# Patient Record
Sex: FEMALE | Race: WHITE | NOT HISPANIC OR LATINO | Employment: FULL TIME | URBAN - NONMETROPOLITAN AREA
[De-identification: names, ages, dates, MRNs, and addresses within clinical notes are randomized per-mention and may not be internally consistent; named-entity substitution may affect disease eponyms.]

---

## 2018-03-09 ENCOUNTER — DOCUMENTATION ONLY (OUTPATIENT)
Dept: FAMILY MEDICINE | Facility: OTHER | Age: 38
End: 2018-03-09

## 2018-03-09 RX ORDER — DOCUSATE SODIUM 100 MG/1
100 CAPSULE, LIQUID FILLED ORAL 2 TIMES DAILY
COMMUNITY
Start: 2016-03-09 | End: 2020-10-09

## 2018-03-09 RX ORDER — TOPIRAMATE 100 MG/1
100 TABLET, FILM COATED ORAL PRN
COMMUNITY
End: 2020-10-09

## 2018-03-09 RX ORDER — CIPROFLOXACIN 500 MG/1
1 TABLET, FILM COATED ORAL 2 TIMES DAILY
COMMUNITY
Start: 2016-03-10 | End: 2019-03-31

## 2018-03-09 RX ORDER — METRONIDAZOLE 500 MG/1
500 TABLET ORAL 3 TIMES DAILY
COMMUNITY
Start: 2016-03-10 | End: 2019-03-31

## 2019-03-31 ENCOUNTER — HOSPITAL ENCOUNTER (EMERGENCY)
Facility: OTHER | Age: 39
Discharge: HOME | End: 2019-03-31
Attending: EMERGENCY MEDICINE | Admitting: EMERGENCY MEDICINE
Payer: COMMERCIAL

## 2019-03-31 ENCOUNTER — APPOINTMENT (OUTPATIENT)
Dept: GENERAL RADIOLOGY | Facility: OTHER | Age: 39
End: 2019-03-31
Attending: FAMILY MEDICINE
Payer: COMMERCIAL

## 2019-03-31 VITALS
WEIGHT: 210 LBS | BODY MASS INDEX: 33.75 KG/M2 | DIASTOLIC BLOOD PRESSURE: 108 MMHG | SYSTOLIC BLOOD PRESSURE: 203 MMHG | OXYGEN SATURATION: 99 % | HEIGHT: 66 IN | TEMPERATURE: 97.2 F

## 2019-03-31 DIAGNOSIS — M25.561 RIGHT KNEE PAIN, UNSPECIFIED CHRONICITY: ICD-10-CM

## 2019-03-31 PROCEDURE — 73562 X-RAY EXAM OF KNEE 3: CPT | Mod: RT

## 2019-03-31 PROCEDURE — 99282 EMERGENCY DEPT VISIT SF MDM: CPT | Mod: Z6 | Performed by: EMERGENCY MEDICINE

## 2019-03-31 PROCEDURE — 99283 EMERGENCY DEPT VISIT LOW MDM: CPT | Mod: 25 | Performed by: EMERGENCY MEDICINE

## 2019-03-31 ASSESSMENT — ENCOUNTER SYMPTOMS
SHORTNESS OF BREATH: 0
CHEST TIGHTNESS: 0
FEVER: 0
ABDOMINAL PAIN: 0
BACK PAIN: 0
HEMATURIA: 0
WOUND: 0
CONFUSION: 0
CHILLS: 0
ARTHRALGIAS: 1

## 2019-03-31 ASSESSMENT — MIFFLIN-ST. JEOR: SCORE: 1659.3

## 2019-03-31 NOTE — ED AVS SNAPSHOT
[facility]  [address]  [place] [address]  Phone:  [phone]  Fax:  [phone]                                    Destini Oshea   MRN: [id number]    Department:  [facility]   Date of Visit:  3/31/2019           After Visit Summary Signature Page    I have received my discharge instructions, and my questions have been answered. I have discussed any challenges I see with this plan with the nurse or doctor.    ..........................................................................................................................................  Patient/Patient Representative Signature      ..........................................................................................................................................  Patient Representative Print Name and Relationship to Patient    ..................................................               ................................................  Date                                   Time    ..........................................................................................................................................  Reviewed by Signature/Title    ...................................................              ..............................................  Date                                               Time          22EPIC Rev 08/18

## 2019-04-01 NOTE — ED TRIAGE NOTES
Patient reports exacerbation of knee pain after kneeling on it a couple days ago, she reports falling on it about a month ago. Ambulatory.

## 2019-04-01 NOTE — ED PROVIDER NOTES
History     Chief Complaint   Patient presents with     Knee Pain     R side, fell on it a month ago, exacerbated pain yesterday while kneeling      HPI  Destini Oshea is a 38 year old female who was walking outside on ice 1 month ago and slipped and fell.  She landed straight on her right knee.  She was having pain there for some time but it seemed to be getting better.  She has been working quite a bit lately.  Yesterday when she went home she was sitting down with her legs crossed.  She went to get up and when she put some pressure on her knee she had sudden pain in the knee again.  The pain is right at the lower margin of the patella.  She said there has been a lump there ever since the fall.  She was able to work today but it was very painful.  Ice elevation ibuprofen are only helping a little bit.    Allergies:  No Known Allergies    Problem List:    Patient Active Problem List    Diagnosis Date Noted     Postoperative state 2016     Priority: Medium     Acute appendicitis with localized peritonitis 2016     Priority: Medium        Past Medical History:    Past Medical History:   Diagnosis Date     Migraine without status migrainosus, not intractable        Past Surgical History:    Past Surgical History:   Procedure Laterality Date     APPENDECTOMY OPEN      3/9/16,Lap Appy      SECTION      x4       Family History:    No family history on file.    Social History:  Marital Status:  Single [1]  Social History     Tobacco Use     Smoking status: Former Smoker     Last attempt to quit: 3/8/2014     Years since quittin.0     Smokeless tobacco: Never Used   Substance Use Topics     Alcohol use: Not on file     Drug use: Unknown     Types: Other     Comment: Drug use: Not Asked        Medications:      docusate sodium (COLACE) 100 MG capsule   topiramate (TOPAMAX) 100 MG tablet         Review of Systems   Constitutional: Negative for chills and fever.   HENT: Negative for congestion.   "  Eyes: Negative for visual disturbance.   Respiratory: Negative for chest tightness and shortness of breath.    Cardiovascular: Negative for chest pain.   Gastrointestinal: Negative for abdominal pain.   Genitourinary: Negative for hematuria.   Musculoskeletal: Positive for arthralgias. Negative for back pain.   Skin: Negative for rash and wound.   Neurological: Negative for syncope.   Psychiatric/Behavioral: Negative for confusion.       Physical Exam   BP: (!) 199/137  Heart Rate: 100  Temp: 97.2  F (36.2  C)  Height: 167.6 cm (5' 6\")  Weight: 95.3 kg (210 lb)  SpO2: 99 %      Physical Exam   Constitutional: She is oriented to person, place, and time. She appears well-developed and well-nourished. No distress.   HENT:   Head: Normocephalic and atraumatic.   Eyes: Conjunctivae are normal.   Neck: Neck supple.   Cardiovascular: Normal rate.   Pulmonary/Chest: Effort normal.   Musculoskeletal:   Right knee with no edema.  No obvious deformity.  She is tender to palpation over the inferior portion of the patella but no defect is palpable.  No other tenderness to the knee joint.  Full range of motion.  She can bear weight without much difficulty.   Neurological: She is alert and oriented to person, place, and time.   Skin: Skin is warm and dry. She is not diaphoretic.   Psychiatric: She has a normal mood and affect. Her behavior is normal.   Nursing note and vitals reviewed.      ED Course        Procedures              Results for orders placed or performed during the hospital encounter of 03/31/19 (from the past 24 hour(s))   XR Knee Right 3 Views    Narrative    PROCEDURE:  XR KNEE RT 3 VW    HISTORY: fell a month ago    COMPARISON:  None.    TECHNIQUE:  3 views of the right knee were obtained.    FINDINGS:  No fracture or dislocation is identified. The joint spaces  are preserved.        Impression    IMPRESSION: Normal right knee      EVA PERALTA MD       Medications - No data to display    Assessments & " Plan (with Medical Decision Making)     I have reviewed the nursing notes.    I have reviewed the findings, diagnosis, plan and need for follow up with the patient.  Knee x-ray appears normal.  No obvious fracture or defect.  At this point she should continue ice elevation ibuprofen.  If pain continues she should follow-up in clinic to discuss with her primary physician.       Medication List      There are no discharge medications for this visit.         Final diagnoses:   Right knee pain, unspecified chronicity       3/31/2019   [facility]     Gian Lane MD  03/31/19 2000

## 2020-10-09 ENCOUNTER — OFFICE VISIT (OUTPATIENT)
Dept: FAMILY MEDICINE | Facility: OTHER | Age: 40
End: 2020-10-09
Attending: PHYSICIAN ASSISTANT

## 2020-10-09 VITALS
BODY MASS INDEX: 33.17 KG/M2 | OXYGEN SATURATION: 97 % | TEMPERATURE: 98.3 F | WEIGHT: 206.4 LBS | DIASTOLIC BLOOD PRESSURE: 82 MMHG | HEIGHT: 66 IN | SYSTOLIC BLOOD PRESSURE: 144 MMHG | RESPIRATION RATE: 16 BRPM | HEART RATE: 118 BPM

## 2020-10-09 DIAGNOSIS — N63.0 BREAST LUMP IN FEMALE: Primary | ICD-10-CM

## 2020-10-09 PROCEDURE — 99213 OFFICE O/P EST LOW 20 MIN: CPT | Performed by: PHYSICIAN ASSISTANT

## 2020-10-09 SDOH — HEALTH STABILITY: MENTAL HEALTH: HOW MANY STANDARD DRINKS CONTAINING ALCOHOL DO YOU HAVE ON A TYPICAL DAY?: NOT ASKED

## 2020-10-09 SDOH — HEALTH STABILITY: MENTAL HEALTH: HOW OFTEN DO YOU HAVE 6 OR MORE DRINKS ON ONE OCCASION?: NOT ASKED

## 2020-10-09 SDOH — HEALTH STABILITY: MENTAL HEALTH: HOW OFTEN DO YOU HAVE A DRINK CONTAINING ALCOHOL?: NOT ASKED

## 2020-10-09 ASSESSMENT — MIFFLIN-ST. JEOR: SCORE: 1632.97

## 2020-10-09 NOTE — NURSING NOTE
Patient presents to clinic with concerns about a lump she found in her right breast 1 1/2 weeks ago. No family history  Ela Bains LPN ....................  10/9/2020   2:06 PM

## 2020-10-09 NOTE — PROGRESS NOTES
"  SUBJECTIVE:     HPI  Destini Oshea is a 40 year old female who presents to clinic today for evaluation of breast concerns and requesting to establish care today.     Patient notes she felt a lump in her right breast about one week ago. It has not changed or grown. No pain associated. No associated overlying skin changes, nipple discharge or retractions. No personal or family history of breast cancer or other cancers. Has never had a mammogram due to under minimum age.         Review of Systems   Per HPI.     PAST MEDICAL HISTORY:   Past Medical History:   Diagnosis Date     Migraine without status migrainosus, not intractable     No Comments Provided       PAST SURGICAL HISTORY:   Past Surgical History:   Procedure Laterality Date     APPENDECTOMY OPEN      3/9/16,Lap Appy      SECTION      x4       FAMILY HISTORY: No family history on file.    SOCIAL HISTORY:   Social History     Tobacco Use     Smoking status: Former Smoker     Quit date: 3/8/2014     Years since quittin.5     Smokeless tobacco: Never Used   Substance Use Topics     Alcohol use: Yes      No Known Allergies  No current outpatient medications on file.     No current facility-administered medications for this visit.        OBJECTIVE:     BP (!) 144/82   Pulse 118   Temp 98.3  F (36.8  C)   Resp 16   Ht 1.676 m (5' 6\")   Wt 93.6 kg (206 lb 6.4 oz)   LMP 2020   SpO2 97%   Breastfeeding No   BMI 33.31 kg/m    Body mass index is 33.31 kg/m .  Physical Exam  General: Pleasant, in no apparent distress.  Cardiovascular: Regular rate and rhythm with S1 equal to S2. No murmurs, friction rubs, or gallops.   Respiratory: Lungs are resonant and clear to auscultation bilaterally. No wheezes, crackles, or rhonchi.  Breast: Breasts were examined in seated and supine position. No visible skin changes or retractions. No nipple discharge or retractions bilaterally. Palpable lump in right breast above nipple in 10/11 o'clock position " "that is not tender and palpable mass in left lower breast in mid line that is not tender. No other palpable lumps. No visible or palpable axillary lymphadenopathy bilaterally.   Skin: No jaundice, pallor, rashes, or lesions.  Psych: Appropriate mood and affect.        ASSESSMENT/PLAN:     1. Breast lump in female      Palpable bilateral breast lumps, one in right and one in left breast as outlined above. Patient notes the lump in left lower breast has been there \"for as long as she can remember\" without change. Ordered bilateral breast US and diagnostic mammogram for further evaluation of both masses. Follow up as needed.       Chiqui Lorenzana PA-C  [facility]    "

## 2020-10-12 ENCOUNTER — HOSPITAL ENCOUNTER (OUTPATIENT)
Dept: ULTRASOUND IMAGING | Facility: OTHER | Age: 40
End: 2020-10-12
Attending: PHYSICIAN ASSISTANT

## 2020-10-12 ENCOUNTER — HOSPITAL ENCOUNTER (OUTPATIENT)
Dept: MAMMOGRAPHY | Facility: OTHER | Age: 40
End: 2020-10-12
Attending: PHYSICIAN ASSISTANT

## 2020-10-12 DIAGNOSIS — N63.0 BREAST LUMP IN FEMALE: ICD-10-CM

## 2020-10-12 PROCEDURE — 76642 ULTRASOUND BREAST LIMITED: CPT | Mod: 50

## 2020-10-12 PROCEDURE — G0279 TOMOSYNTHESIS, MAMMO: HCPCS

## 2022-11-29 ENCOUNTER — OFFICE VISIT (OUTPATIENT)
Dept: FAMILY MEDICINE | Facility: OTHER | Age: 42
End: 2022-11-29
Attending: NURSE PRACTITIONER
Payer: COMMERCIAL

## 2022-11-29 VITALS
TEMPERATURE: 98.2 F | HEIGHT: 66 IN | HEART RATE: 107 BPM | SYSTOLIC BLOOD PRESSURE: 138 MMHG | DIASTOLIC BLOOD PRESSURE: 108 MMHG | BODY MASS INDEX: 35.03 KG/M2 | WEIGHT: 218 LBS | OXYGEN SATURATION: 99 % | RESPIRATION RATE: 16 BRPM

## 2022-11-29 DIAGNOSIS — K08.89 PAIN, DENTAL: ICD-10-CM

## 2022-11-29 DIAGNOSIS — T36.95XA ANTIBIOTIC-INDUCED YEAST INFECTION: ICD-10-CM

## 2022-11-29 DIAGNOSIS — K04.7 DENTAL INFECTION: Primary | ICD-10-CM

## 2022-11-29 DIAGNOSIS — J06.9 VIRAL URI WITH COUGH: ICD-10-CM

## 2022-11-29 DIAGNOSIS — B37.9 ANTIBIOTIC-INDUCED YEAST INFECTION: ICD-10-CM

## 2022-11-29 PROCEDURE — 99213 OFFICE O/P EST LOW 20 MIN: CPT | Performed by: NURSE PRACTITIONER

## 2022-11-29 RX ORDER — ACETAMINOPHEN 500 MG
500 TABLET ORAL
COMMUNITY

## 2022-11-29 RX ORDER — IBUPROFEN 200 MG
200 TABLET ORAL EVERY 4 HOURS PRN
COMMUNITY

## 2022-11-29 RX ORDER — FLUCONAZOLE 150 MG/1
150 TABLET ORAL ONCE
Qty: 1 TABLET | Refills: 0 | Status: SHIPPED | OUTPATIENT
Start: 2022-11-29 | End: 2022-11-29

## 2022-11-29 RX ORDER — HYDROCODONE BITARTRATE AND ACETAMINOPHEN 5; 325 MG/1; MG/1
1 TABLET ORAL EVERY 6 HOURS PRN
Qty: 6 TABLET | Refills: 0 | Status: SHIPPED | OUTPATIENT
Start: 2022-11-29 | End: 2022-12-01

## 2022-11-29 ASSESSMENT — PAIN SCALES - GENERAL: PAINLEVEL: MODERATE PAIN (5)

## 2022-11-29 NOTE — NURSING NOTE
Chief Complaint   Patient presents with     Cough     X 3 DAYS     Dental Pain     X 3- 4  days- upper R     Patient in clinic with daughter.  Patient does not have a dentist at this time as she has been without insurance for past 4 years - patient has ins now.   Patient has not slept much due to mouth pain and cough.  Tx with ibuprofen.    Advanced Care Planning on file? no    Medication Review Completed: complete    FOOD SECURITY SCREENING QUESTIONS:    The next two questions are to help us understand your food security.  If you are feeling you need any assistance in this area, we have resources available to support you today.    Hunger Vital Signs:  Within the past 12 months we worried whether our food would run out before we got money to buy more. Never  Within the past 12 months the food we bought just didn't last and we didn't have money to get more. Never    Lizz Lopes LPN

## 2022-11-29 NOTE — PROGRESS NOTES
ASSESSMENT/PLAN:     I have reviewed the nursing notes.  I have reviewed the findings, diagnosis, plan and need for follow up with the patient.      1. Dental infection    - amoxicillin-clavulanate (AUGMENTIN) 875-125 MG tablet; Take 1 tablet by mouth 2 times daily for 10 days  Dispense: 20 tablet; Refill: 0    History of chronically decayed and broken teeth.  Now with current single tooth infection of left upper molar without noted abscess.  Patient states she now has dental insurance and will be following up with a dentist within the next couple months to have all of her remaining teeth extracted.    2. Pain, dental    - HYDROcodone-acetaminophen (NORCO) 5-325 MG tablet; Take 1 tablet by mouth every 6 hours as needed for severe pain (7-10)  Dispense: 6 tablet; Refill: 0    PDMP reviewed with score of 0    Patient has tried Excedrin and ibuprofen without relief.  She has no noted overuse or abuse of narcotics therefore is prescribed total of 6 tabs to get her through the acute phase of dental pain until infection improves.  Patient was told she will not receive any further refills after this acute period.  She can also supplement with ibuprofen or Excedrin as recommended on the bottle for dosing.    Patient is not on any other sedatibe or high risk medications.  Discussed with patient risk of use of narcotics.  Patient informed no driving for 8 hours after taking a pain pill.    3. Viral URI with cough    Discussed with patient that symptoms and exam are consistent with viral illness.    No clinical indications for antibiotic treatment at this time.    Symptomatic treatment - Encouraged fluids, salt water gargles, honey, elevation, humidifier, saline nasal spray, sinus rinse/netti pot, lozenges, tea, soup, smoothies, popsicles, topical vapor rub, rest, etc     May use over-the-counter Tylenol or ibuprofen PRN    Discussed warning signs/symptoms indicative of need to f/u  Follow up if symptoms persist or worsen or  concerns      4. Antibiotic-induced yeast infection    - fluconazole (DIFLUCAN) 150 MG tablet; Take 1 tablet (150 mg) by mouth once for 1 dose Take at onset of symptoms if needed  Dispense: 1 tablet; Refill: 0    Patient reports history of antibiotic induced yeast infections and request a Diflucan to have on hand to start at onset of symptoms.          I explained my diagnostic considerations and recommendations to the patient, who voiced understanding and agreement with the treatment plan. All questions were answered. We discussed potential side effects of any prescribed or recommended therapies, as well as expectations for response to treatments.    Swetha Sanchez NP  [facility]      SUBJECTIVE:   Destini Oshea is a 42 year old female who presents to clinic today for the following health issues:  Cough and dental pain    HPI  Cough for the past 3 days.  Congested cough, productive of tan phlegm.  Chest congestion, improving, less deep.  Chest tightness and heaviness.  Feeling winded and short of breath.  Feeling weak and fatigued.  Slept a lot 2 days ago.  Low grade fevers.  Runny and stuffy nose for the past 3 days.  Sore throat for the past 3 days, worse initially.  Painful to swallow solids.  No appetite due to tooth pain.  Difficulty chewing due to dental pain.  No noted drainage from tooth.  No noted hot or cold sensitivity.  Swelling in upper cheek.  Chronic headaches, no change.    Left upper dental pain and swelling started yesterday.    She has plans to get all her teeth pulled in the next few months due to chronically poor teeth.    Taking Excedrin and Ibuprofen without relief.      Past Medical History:   Diagnosis Date     Migraine without status migrainosus, not intractable     No Comments Provided     Past Surgical History:   Procedure Laterality Date     APPENDECTOMY OPEN      3/9/16,Lap Appy      SECTION      x4     Social History     Tobacco Use     Smoking  "status: Former     Types: Cigarettes     Quit date: 3/8/2014     Years since quittin.7     Smokeless tobacco: Never   Substance Use Topics     Alcohol use: Yes     Current Outpatient Medications   Medication Sig Dispense Refill     ibuprofen (ADVIL/MOTRIN) 200 MG tablet Take 200 mg by mouth every 4 hours as needed for pain       acetaminophen (TYLENOL) 500 MG tablet Take 500 mg by mouth (Patient not taking: Reported on 2022)       Aspirin-Acetaminophen-Caffeine (EXCEDRIN MIGRAINE PO) Take by mouth as needed For migraine (Patient not taking: Reported on 2022)       No Known Allergies      Past medical history, past surgical history, current medications and allergies reviewed and accurate to the best of my knowledge.        OBJECTIVE:     BP (!) 138/108 (BP Location: Right arm, Patient Position: Sitting, Cuff Size: Adult Large)   Pulse 107   Temp 98.2  F (36.8  C) (Tympanic)   Resp 16   Ht 1.676 m (5' 6\")   Wt 98.9 kg (218 lb)   LMP 2022 (Approximate)   SpO2 99%   BMI 35.19 kg/m    Body mass index is 35.19 kg/m .     Physical Exam  General Appearance: Miserable but non-ill appearing adult female, appropriate appearance for age. No acute distress.  Crying in pain.    Ears: Left TM intact, no erythema, no effusion, no bulging, no purulence.  Right TM intact, no erythema, no effusion, no bulging, no purulence.  Left auditory canal clear without drainage or bleeding.  Right auditory canal clear without drainage or bleeding.  Normal external ears, non tender.  Eyes: conjunctivae normal without erythema or irritation, corneas clear, no drainage or crusting, no eyelid swelling, pupils equal   Orophayrnx: moist mucous membranes, pharynx without erythema, tonsils without hypertrophy, tonsils without erythema, no tonsillar exudates, no oral lesions, no palate petechiae, no post nasal drip seen, no trismus, voice clear.  Left upper 2nd molar with chronic decay and broken appearance, no noted " surrounding gum swelling or abscess, tooth is tender to light touch.  All teeth in poor condition with chronic decay and missing teeth.    Sinuses:  Left maxillary tissue swelling and tenderness to palpation.    Nose:  Active drainage and congestion   Neck: supple without adenopathy  Respiratory: normal chest wall and respirations.  Normal effort.  Clear to auscultation bilaterally, no wheezing, crackles or rhonchi.  No increased work of breathing.  No cough appreciated.  Cardiac: RRR with no murmurs  Musculoskeletal:  Equal movement of bilateral upper extremities.  Equal movement of bilateral lower extremities.  Normal gait.    Dermatological: no rashes noted of exposed skin  Psychological: normal affect, alert, oriented, and pleasant.

## 2022-11-29 NOTE — PATIENT INSTRUCTIONS
No driving while on pain medication.  No driving for 8 hours after last dose of pain medication.

## 2022-11-29 NOTE — LETTER
[facility]  [address]  [place] [address]  Phone: [phone]  Fax: [phone]    November 29, 2022        Destini Oshea  [address] [place] [address]          To whom it may concern:    RE: Destini Oshea    Patient was seen and treated today at our clinic and missed work due to illness on 11/29/22 and 11/30/22.      Please contact me for questions or concerns.      Sincerely,        Swetha Sanchez NP